# Patient Record
Sex: FEMALE | Race: WHITE
[De-identification: names, ages, dates, MRNs, and addresses within clinical notes are randomized per-mention and may not be internally consistent; named-entity substitution may affect disease eponyms.]

---

## 2022-07-17 ENCOUNTER — HOSPITAL ENCOUNTER (OUTPATIENT)
Dept: HOSPITAL 79 - ER1 | Age: 62
Setting detail: OBSERVATION
LOS: 2 days | Discharge: HOME | End: 2022-07-19
Attending: INTERNAL MEDICINE | Admitting: STUDENT IN AN ORGANIZED HEALTH CARE EDUCATION/TRAINING PROGRAM
Payer: COMMERCIAL

## 2022-07-17 VITALS — BODY MASS INDEX: 30.17 KG/M2 | WEIGHT: 199.06 LBS | HEIGHT: 68 IN

## 2022-07-17 DIAGNOSIS — K44.9: ICD-10-CM

## 2022-07-17 DIAGNOSIS — Z88.5: ICD-10-CM

## 2022-07-17 DIAGNOSIS — E03.9: ICD-10-CM

## 2022-07-17 DIAGNOSIS — Z79.02: ICD-10-CM

## 2022-07-17 DIAGNOSIS — F17.210: ICD-10-CM

## 2022-07-17 DIAGNOSIS — E53.8: ICD-10-CM

## 2022-07-17 DIAGNOSIS — Z79.82: ICD-10-CM

## 2022-07-17 DIAGNOSIS — K57.90: ICD-10-CM

## 2022-07-17 DIAGNOSIS — Z88.2: ICD-10-CM

## 2022-07-17 DIAGNOSIS — Z79.899: ICD-10-CM

## 2022-07-17 DIAGNOSIS — K02.9: ICD-10-CM

## 2022-07-17 DIAGNOSIS — E78.5: ICD-10-CM

## 2022-07-17 DIAGNOSIS — I63.22: ICD-10-CM

## 2022-07-17 DIAGNOSIS — I63.89: ICD-10-CM

## 2022-07-17 DIAGNOSIS — I63.232: Primary | ICD-10-CM

## 2022-07-17 DIAGNOSIS — Z88.8: ICD-10-CM

## 2022-07-17 DIAGNOSIS — Z88.1: ICD-10-CM

## 2022-07-17 DIAGNOSIS — Z88.0: ICD-10-CM

## 2022-07-17 LAB
BUN/CREATININE RATIO: 18 (ref 0–10)
HGB BLD-MCNC: 15.8 GM/DL (ref 12.3–15.3)
RED BLOOD COUNT: 4.74 M/UL (ref 4–5.1)

## 2022-07-17 PROCEDURE — G0378 HOSPITAL OBSERVATION PER HR: HCPCS

## 2022-07-18 LAB
BUN/CREATININE RATIO: 16 (ref 0–10)
HGB BLD-MCNC: 14.8 GM/DL (ref 12.3–15.3)
RED BLOOD COUNT: 4.52 M/UL (ref 4–5.1)
WHITE BLOOD COUNT: 10.6 K/UL (ref 4.5–11)

## 2022-07-19 LAB
BUN/CREATININE RATIO: 19 (ref 0–10)
HGB BLD-MCNC: 15.2 GM/DL (ref 12.3–15.3)
RED BLOOD COUNT: 4.61 M/UL (ref 4–5.1)
WHITE BLOOD COUNT: 13.1 K/UL (ref 4.5–11)
WHITE BLOOD COUNT: 13.7 K/UL (ref 4.5–11)

## 2022-07-19 NOTE — NUR
DR HERNANDEZ HERE SEEING PT,HE EXPLAINED TO PT THE IMPORTANCE OG TAKING PLAVIX
AND PT STATED THAT SHE DIDNT CARE WHAT HE SAID THAT SHE WASNT TAKING PLAVIX
UNTIL SHE SEES HER REGULAR PHYSICIAN